# Patient Record
Sex: MALE | URBAN - METROPOLITAN AREA
[De-identification: names, ages, dates, MRNs, and addresses within clinical notes are randomized per-mention and may not be internally consistent; named-entity substitution may affect disease eponyms.]

---

## 2019-04-03 ENCOUNTER — APPOINTMENT (RX ONLY)
Dept: URBAN - METROPOLITAN AREA CLINIC 105 | Facility: CLINIC | Age: 34
Setting detail: DERMATOLOGY
End: 2019-04-03

## 2019-04-03 DIAGNOSIS — L72.11 PILAR CYST: ICD-10-CM

## 2019-04-03 DIAGNOSIS — B07.8 OTHER VIRAL WARTS: ICD-10-CM

## 2019-04-03 PROCEDURE — ? COUNSELING

## 2019-04-03 PROCEDURE — ? NOTED ON EXAM BUT NOT TREATED

## 2019-04-03 PROCEDURE — ? LIQUID NITROGEN

## 2019-04-03 PROCEDURE — 17110 DESTRUCTION B9 LES UP TO 14: CPT

## 2019-04-03 PROCEDURE — 99202 OFFICE O/P NEW SF 15 MIN: CPT | Mod: 25

## 2019-04-03 PROCEDURE — ? ADDITIONAL NOTES

## 2019-04-03 ASSESSMENT — LOCATION DETAILED DESCRIPTION DERM
LOCATION DETAILED: RIGHT CENTRAL PARIETAL SCALP
LOCATION DETAILED: LEFT HYPOTHENAR EMINENCE
LOCATION DETAILED: LEFT PROXIMAL DORSAL INDEX FINGER

## 2019-04-03 ASSESSMENT — LOCATION SIMPLE DESCRIPTION DERM
LOCATION SIMPLE: LEFT HAND
LOCATION SIMPLE: SCALP
LOCATION SIMPLE: LEFT INDEX FINGER

## 2019-04-03 ASSESSMENT — LOCATION ZONE DERM
LOCATION ZONE: SCALP
LOCATION ZONE: FINGER
LOCATION ZONE: HAND

## 2019-04-03 NOTE — PROCEDURE: ADDITIONAL NOTES
Additional Notes: If patient has any inflammation in the future, it is ok to call antibiotics in to patient’s preferred pharmacy.
Detail Level: Simple

## 2019-04-03 NOTE — PROCEDURE: NOTED ON EXAM BUT NOT TREATED
Detail Level: Zone
Text: The above diagnosis and findings were noted on the exam but not discussed at this time with the patient.  Patient will RTC for FBSE at next visit!

## 2019-05-01 ENCOUNTER — APPOINTMENT (RX ONLY)
Dept: URBAN - METROPOLITAN AREA CLINIC 105 | Facility: CLINIC | Age: 34
Setting detail: DERMATOLOGY
End: 2019-05-01

## 2019-05-01 DIAGNOSIS — B07.8 OTHER VIRAL WARTS: ICD-10-CM | Status: IMPROVED

## 2019-05-01 PROCEDURE — 17110 DESTRUCTION B9 LES UP TO 14: CPT

## 2019-05-01 PROCEDURE — ? SEPARATE AND IDENTIFIABLE DOCUMENTATION

## 2019-05-01 PROCEDURE — 99213 OFFICE O/P EST LOW 20 MIN: CPT | Mod: 25

## 2019-05-01 PROCEDURE — ? LIQUID NITROGEN

## 2019-05-01 PROCEDURE — ? COUNSELING

## 2019-05-01 ASSESSMENT — LOCATION DETAILED DESCRIPTION DERM
LOCATION DETAILED: LEFT HYPOTHENAR EMINENCE
LOCATION DETAILED: LEFT PROXIMAL DORSAL THUMB

## 2019-05-01 ASSESSMENT — LOCATION SIMPLE DESCRIPTION DERM
LOCATION SIMPLE: LEFT THUMB
LOCATION SIMPLE: LEFT HAND

## 2019-05-01 ASSESSMENT — LOCATION ZONE DERM
LOCATION ZONE: FINGER
LOCATION ZONE: HAND

## 2019-05-29 ENCOUNTER — APPOINTMENT (RX ONLY)
Dept: URBAN - METROPOLITAN AREA CLINIC 105 | Facility: CLINIC | Age: 34
Setting detail: DERMATOLOGY
End: 2019-05-29

## 2019-05-29 DIAGNOSIS — B07.8 OTHER VIRAL WARTS: ICD-10-CM | Status: IMPROVED

## 2019-05-29 PROCEDURE — ? COUNSELING

## 2019-05-29 PROCEDURE — 99213 OFFICE O/P EST LOW 20 MIN: CPT | Mod: 25

## 2019-05-29 PROCEDURE — 17110 DESTRUCTION B9 LES UP TO 14: CPT

## 2019-05-29 PROCEDURE — ? PRESCRIPTION

## 2019-05-29 PROCEDURE — ? ADDITIONAL NOTES

## 2019-05-29 PROCEDURE — ? SEPARATE AND IDENTIFIABLE DOCUMENTATION

## 2019-05-29 PROCEDURE — 11900 INJECT SKIN LESIONS </W 7: CPT | Mod: 59

## 2019-05-29 PROCEDURE — ? IMMUNOTHERAPY: CANDIDA ANTIGEN

## 2019-05-29 PROCEDURE — ? LIQUID NITROGEN

## 2019-05-29 RX ORDER — IMIQUIMOD 50 MG/G
CREAM TOPICAL QHS
Qty: 1 | Refills: 0 | Status: ERX | COMMUNITY
Start: 2019-05-29

## 2019-05-29 RX ADMIN — IMIQUIMOD: 50 CREAM TOPICAL at 15:49

## 2019-05-29 ASSESSMENT — LOCATION ZONE DERM
LOCATION ZONE: HAND
LOCATION ZONE: FINGER

## 2019-05-29 ASSESSMENT — LOCATION SIMPLE DESCRIPTION DERM
LOCATION SIMPLE: LEFT THUMB
LOCATION SIMPLE: LEFT HAND

## 2019-05-29 ASSESSMENT — LOCATION DETAILED DESCRIPTION DERM
LOCATION DETAILED: LEFT PROXIMAL DORSAL THUMB
LOCATION DETAILED: LEFT HYPOTHENAR EMINENCE

## 2019-05-29 NOTE — PROCEDURE: IMMUNOTHERAPY: CANDIDA ANTIGEN
Render Post-Care Instructions In Note?: no
Post-Care Instructions: I reviewed with the patient in detail post-care instructions. Mild to moderate itching, tenderness, or swelling at the injection site is common and usually lasts 24 to 48 hours. The patient may elevate the painful area, apply ice for 5 minutes at a time, take tylenol every 4 to 6 hours. Only 5% of Candida immunotherapy patients get flu-like symptoms. This usually lasts only 24 to 48 hours. It is possible to prevent this at future visits by taking tylenol before the injection. If warts are on the fingers, all rings should be removed for 2 days post treatment. The patient understands that multiple treatments may be needed and there is no gaurantee of improvement.
Total Amount Injected In Ccs: 0.1
Detail Level: Detailed
Consent was obtained from the patient. The risks of candida antigen injection were discussed in detail. Specifically, the risks of redness, itching, pain and allergic reactions were addressed. Prior to injection all of the patient's questions were answered.
Treatment #: 1

## 2019-05-29 NOTE — PROCEDURE: MIPS QUALITY
Quality 431: Preventive Care And Screening: Unhealthy Alcohol Use - Screening: Patient screened for unhealthy alcohol use using a single question and scores less than 2 times per year
Quality 110: Preventive Care And Screening: Influenza Immunization: Influenza immunization was not ordered or administered, reason not given
Quality 130: Documentation Of Current Medications In The Medical Record: Current Medications Documented
Detail Level: Detailed
Quality 131: Pain Assessment And Follow-Up: Pain assessment using a standardized tool is documented as negative, no follow-up plan required
Quality 226: Preventive Care And Screening: Tobacco Use: Screening And Cessation Intervention: Patient screened for tobacco use and is an ex/non-smoker

## 2019-05-29 NOTE — PROCEDURE: ADDITIONAL NOTES
Additional Notes: - Continue with filing lesion down and then apply imiquimod occluded with duct tape Monday, Wednesday, Friday
Detail Level: Simple

## 2019-06-26 ENCOUNTER — APPOINTMENT (RX ONLY)
Dept: URBAN - METROPOLITAN AREA CLINIC 107 | Facility: CLINIC | Age: 34
Setting detail: DERMATOLOGY
End: 2019-06-26

## 2019-06-26 DIAGNOSIS — B07.8 OTHER VIRAL WARTS: ICD-10-CM

## 2019-06-26 PROCEDURE — ? COUNSELING

## 2019-06-26 PROCEDURE — 99213 OFFICE O/P EST LOW 20 MIN: CPT | Mod: 25

## 2019-06-26 PROCEDURE — ? LIQUID NITROGEN

## 2019-06-26 PROCEDURE — ? TREATMENT REGIMEN

## 2019-06-26 PROCEDURE — ? SEPARATE AND IDENTIFIABLE DOCUMENTATION

## 2019-06-26 PROCEDURE — 17110 DESTRUCTION B9 LES UP TO 14: CPT

## 2019-06-26 ASSESSMENT — LOCATION SIMPLE DESCRIPTION DERM
LOCATION SIMPLE: LEFT HAND
LOCATION SIMPLE: LEFT THUMB

## 2019-06-26 ASSESSMENT — LOCATION DETAILED DESCRIPTION DERM
LOCATION DETAILED: LEFT HYPOTHENAR EMINENCE
LOCATION DETAILED: DORSAL INTERPHALANGEAL JOINT LEFT THUMB

## 2019-06-26 ASSESSMENT — LOCATION ZONE DERM
LOCATION ZONE: HAND
LOCATION ZONE: FINGER

## 2019-06-26 NOTE — PROCEDURE: MIPS QUALITY
Quality 130: Documentation Of Current Medications In The Medical Record: Current Medications Documented
Detail Level: Detailed
Quality 131: Pain Assessment And Follow-Up: Pain assessment using a standardized tool is documented as negative, no follow-up plan required
Quality 431: Preventive Care And Screening: Unhealthy Alcohol Use - Screening: Patient screened for unhealthy alcohol use using a single question and scores less than 2 times per year
Quality 226: Preventive Care And Screening: Tobacco Use: Screening And Cessation Intervention: Patient screened for tobacco use and is an ex/non-smoker
Quality 110: Preventive Care And Screening: Influenza Immunization: Influenza immunization was not ordered or administered, reason not given

## 2019-06-26 NOTE — PROCEDURE: TREATMENT REGIMEN
Detail Level: Zone
Continue Regimen: Continue with filing lesion down and then apply imiquimod occluded with duct tape Monday, Wednesday, Friday

## 2019-07-24 ENCOUNTER — APPOINTMENT (RX ONLY)
Dept: URBAN - METROPOLITAN AREA CLINIC 105 | Facility: CLINIC | Age: 34
Setting detail: DERMATOLOGY
End: 2019-07-24

## 2019-07-24 DIAGNOSIS — B07.8 OTHER VIRAL WARTS: ICD-10-CM | Status: IMPROVED

## 2019-07-24 PROCEDURE — 99212 OFFICE O/P EST SF 10 MIN: CPT

## 2019-07-24 PROCEDURE — ? SEPARATE AND IDENTIFIABLE DOCUMENTATION

## 2019-07-24 PROCEDURE — ? ADDITIONAL NOTES

## 2019-07-24 PROCEDURE — ? COUNSELING

## 2019-07-24 ASSESSMENT — LOCATION SIMPLE DESCRIPTION DERM: LOCATION SIMPLE: LEFT HAND

## 2019-07-24 ASSESSMENT — LOCATION ZONE DERM: LOCATION ZONE: HAND

## 2019-07-24 ASSESSMENT — LOCATION DETAILED DESCRIPTION DERM: LOCATION DETAILED: LEFT HYPOTHENAR EMINENCE

## 2019-07-24 NOTE — PROCEDURE: MIPS QUALITY
Quality 226: Preventive Care And Screening: Tobacco Use: Screening And Cessation Intervention: Patient screened for tobacco use and is an ex/non-smoker
Quality 131: Pain Assessment And Follow-Up: Pain assessment using a standardized tool is documented as negative, no follow-up plan required
Quality 110: Preventive Care And Screening: Influenza Immunization: Influenza immunization was not ordered or administered, reason not given
Quality 431: Preventive Care And Screening: Unhealthy Alcohol Use - Screening: Patient screened for unhealthy alcohol use using a single question and scores less than 2 times per year
Detail Level: Detailed
Quality 130: Documentation Of Current Medications In The Medical Record: Current Medications Documented

## 2019-07-24 NOTE — PROCEDURE: ADDITIONAL NOTES
Additional Notes: **S/p LN x 4-5 treatments. Appears to be resolved at this time.**\\n\\n- If recurrence, start home imiquimod occluded with duct tape at night or RTC
Detail Level: Simple

## 2025-02-18 NOTE — PROCEDURE: LIQUID NITROGEN
Detail Level: Detailed
Render Post-Care Instructions In Note?: no
Medical Necessity Clause: This procedure was medically necessary because the lesions that were treated were:
Post-Care Instructions: I reviewed with the patient in detail post-care instructions. Patient is to wear sunprotection, and avoid picking at any of the treated lesions. Pt may apply Vaseline to crusted or scabbing areas.
Medical Necessity Information: It is in your best interest to select a reason for this procedure from the list below. All of these items fulfill various CMS LCD requirements except the new and changing color options.
Consent: The patient's consent was obtained including but not limited to risks of crusting, scabbing, blistering, scarring, darker or lighter pigmentary change, recurrence, incomplete removal and infection.
Statement Selected